# Patient Record
Sex: MALE | Race: WHITE
[De-identification: names, ages, dates, MRNs, and addresses within clinical notes are randomized per-mention and may not be internally consistent; named-entity substitution may affect disease eponyms.]

---

## 2017-01-04 ENCOUNTER — HOSPITAL ENCOUNTER (OUTPATIENT)
Dept: HOSPITAL 45 - C.PATHSPEC | Age: 82
Discharge: HOME | End: 2017-01-04
Attending: UROLOGY
Payer: COMMERCIAL

## 2017-01-04 DIAGNOSIS — R97.20: Primary | ICD-10-CM

## 2017-06-16 ENCOUNTER — HOSPITAL ENCOUNTER (OUTPATIENT)
Dept: HOSPITAL 45 - C.LABBC | Age: 82
Discharge: HOME | End: 2017-06-16
Attending: INTERNAL MEDICINE
Payer: COMMERCIAL

## 2017-06-16 DIAGNOSIS — M54.5: Primary | ICD-10-CM

## 2017-06-16 LAB
ALBUMIN/GLOB SERPL: 1 {RATIO} (ref 0.9–2)
ALP SERPL-CCNC: 57 U/L (ref 45–117)
ALT SERPL-CCNC: 28 U/L (ref 12–78)
ANION GAP SERPL CALC-SCNC: 8 MMOL/L (ref 3–11)
AST SERPL-CCNC: 21 U/L (ref 15–37)
BASOPHILS # BLD: 0.03 K/UL (ref 0–0.2)
BASOPHILS NFR BLD: 0.6 %
BUN SERPL-MCNC: 17 MG/DL (ref 7–18)
BUN/CREAT SERPL: 15.8 (ref 10–20)
CALCIUM SERPL-MCNC: 9 MG/DL (ref 8.5–10.1)
CHLORIDE SERPL-SCNC: 100 MMOL/L (ref 98–107)
CHOLEST/HDLC SERPL: 2.6 {RATIO}
CO2 SERPL-SCNC: 30 MMOL/L (ref 21–32)
COMPLETE: YES
CREAT SERPL-MCNC: 1.1 MG/DL (ref 0.6–1.4)
EOSINOPHIL NFR BLD AUTO: 217 K/UL (ref 130–400)
GLOBULIN SER-MCNC: 3.5 GM/DL (ref 2.5–4)
GLUCOSE SERPL-MCNC: 100 MG/DL (ref 70–99)
GLUCOSE UR QL: 49 MG/DL
HCT VFR BLD CALC: 45.5 % (ref 42–52)
IG%: 0.4 %
IMM GRANULOCYTES NFR BLD AUTO: 23.5 %
KETONES UR QL STRIP: 68 MG/DL
LYMPHOCYTES # BLD: 1.13 K/UL (ref 1.2–3.4)
MCH RBC QN AUTO: 29.2 PG (ref 25–34)
MCHC RBC AUTO-ENTMCNC: 32.3 G/DL (ref 32–36)
MCV RBC AUTO: 90.5 FL (ref 80–100)
MONOCYTES NFR BLD: 11.4 %
NEUTROPHILS # BLD AUTO: 4.6 %
NEUTROPHILS NFR BLD AUTO: 59.5 %
NITRITE UR QL STRIP: 62 MG/DL (ref 0–150)
PH UR: 129 MG/DL (ref 0–200)
PMV BLD AUTO: 9.7 FL (ref 7.4–10.4)
POTASSIUM SERPL-SCNC: 4 MMOL/L (ref 3.5–5.1)
RBC # BLD AUTO: 5.03 M/UL (ref 4.7–6.1)
SODIUM SERPL-SCNC: 138 MMOL/L (ref 136–145)
TSH SERPL-ACNC: 1.68 UIU/ML (ref 0.3–4.5)
VERY LOW DENSITY LIPOPROT CALC: 12 MG/DL
WBC # BLD AUTO: 4.81 K/UL (ref 4.8–10.8)

## 2017-07-17 ENCOUNTER — HOSPITAL ENCOUNTER (OUTPATIENT)
Dept: HOSPITAL 45 - C.LABBC | Age: 82
Discharge: HOME | End: 2017-07-17
Attending: UROLOGY
Payer: COMMERCIAL

## 2017-07-17 DIAGNOSIS — C61: Primary | ICD-10-CM

## 2017-07-17 LAB
BUN SERPL-MCNC: 19 MG/DL (ref 7–18)
BUN/CREAT SERPL: 17 (ref 10–20)
CREAT SERPL-MCNC: 1.1 MG/DL (ref 0.6–1.4)
PSA SERPL-MCNC: 7.03 NG/ML (ref 0–4)

## 2017-10-05 ENCOUNTER — HOSPITAL ENCOUNTER (OUTPATIENT)
Dept: HOSPITAL 45 - C.LABBC | Age: 82
Discharge: HOME | End: 2017-10-05
Attending: UROLOGY
Payer: COMMERCIAL

## 2017-10-05 DIAGNOSIS — C61: Primary | ICD-10-CM

## 2017-10-05 LAB
BUN SERPL-MCNC: 16 MG/DL (ref 7–18)
BUN/CREAT SERPL: 15.9 (ref 10–20)
CREAT SERPL-MCNC: 1 MG/DL (ref 0.6–1.4)
PSA FREE MFR SERPL: 11.8 %
PSA FREE SERPL-MCNC: 0.77 NG/ML

## 2017-10-12 ENCOUNTER — HOSPITAL ENCOUNTER (OUTPATIENT)
Dept: HOSPITAL 45 - C.MRIBC | Age: 82
Discharge: HOME | End: 2017-10-12
Attending: UROLOGY
Payer: COMMERCIAL

## 2017-10-12 DIAGNOSIS — N40.0: ICD-10-CM

## 2017-10-12 DIAGNOSIS — C61: Primary | ICD-10-CM

## 2017-10-12 NOTE — DIAGNOSTIC IMAGING REPORT
PROSTATE MRI COMBO



CLINICAL HISTORY: 82 years-old Male presenting with prostate carcinoma. PSA 7

ng/mL. 



TECHNIQUE: Multisequence, multiplanar MR imaging of the prostate was performed

before and after the administration of intravenous contrast. Additional

postprocessing was performed on a separate [employer] workstation by the

radiologist for 3-D volumetric segmentation of the prostate and contouring of

region(s) of interest (JOSE D) for targeting. IV contrast: 6.5 cc intravenous

[facility] 



COMPARISON: None.



FINDINGS:



Prostate: The prostate measures 4.9 x 3.9 x 4.2 cm cm (DynaCAD prostate boundary

segmentation volume 35 mL). Mild changes of benign prostatic hyperplasia.

Precontrast T1 weighted imaging demonstrates no evidence of intrinsic T1

hyperintensity to suggest hemorrhage. Seminal vesicles normal. Suspicious

lesion(s) described below:



Lesion (DynaCAD JOSE D) 1:

Location: Left anterior transition zone at the mid gland. The lesion does not

extend across the midline.

Size: 13 mm (as measured on ADC for PZ lesion and T2WI for TZ lesion)

T2W: 4.     No evidence of extraprostatic extension.

DWI: 4. Focal markedly hypointense on ADC and markedly hyperintense on high

b-value DWI.

DCE: Positive. Focal enhancement corresponding to a suspicious finding, earlier

or contemporaneous with adjacent normal tissue.

PI-RADS: 4. Clinically significant cancer is likely to be present. 





Bladder: Normal.



Bowel: Visualized portion of the rectum normal.



Peritoneum: No free fluid in the pelvis.



Lymph nodes: No lymphadenopathy in the visualized portion of the pelvis.



Vasculature: Iliac vessels patent.



Osseous structures: Normal bone marrow signal intensity.



IMPRESSION:

1.  13 mm lesion in the left anterior transition zone at the mid gland. PI-RADS

4. Clinically significant cancer is likely to be present. This lesion has been

segmented for targeted biopsy.



2.  Benign prostatic hyperplasia.







Electronically signed by:  Faustino Garcia M.D.

10/12/2017 10:34 AM



Dictated Date/Time:  10/12/2017 10:28 AM

## 2017-10-24 ENCOUNTER — HOSPITAL ENCOUNTER (OUTPATIENT)
Dept: HOSPITAL 45 - C.NUCL | Age: 82
Discharge: HOME | End: 2017-10-24
Attending: UROLOGY
Payer: COMMERCIAL

## 2017-10-24 DIAGNOSIS — C61: Primary | ICD-10-CM

## 2017-10-24 NOTE — DIAGNOSTIC IMAGING REPORT
BONE SCAN WHOLE BODY



HISTORY:  82 years-old Male C61 Prostate [id number] metastatic survey in

a patient with prostate cancer. History of right total hip arthroplasty. Patient

complains of back pain. 



COMPARISON: Bone scan 3/14/2011, pelvis radiograph 11/18/2011, CT 12/15/2006.



TECHNIQUE: Anterior and posterior whole body bone scan scintigraphic planar

images were obtained utilizing 26.5 mCi technetium 99 MDP. Scan was obtained 3

hours post injection.



FINDINGS: 

Unchanged hydronephrosis of the left kidney. Physiologic radiotracer uptake is

seen about the right kidney and urinary bladder. Mildly increased tracer uptake

about the appendicular skeletal system suggest degenerative changes, notably

within the knees, shoulders and feet. Photopenic defect within the right femoral

head, neck and proximal shaft correlates with right hip arthroplasty. Mildly

increased radiotracer uptake surrounding arthroplasty is likely secondary to

remodeling changes or loosening.



There is moderately increased radiotracer uptake involving the lower lumbar

spine at L4-L5. Additionally, there is moderate indeterminate radiotracer uptake

of the midline anterior upper chest within the region of the sternum, seen best

on the frontal views. Focal area of mildly increased radiotracer uptake is seen

within the region of the posterior aspect left 11th rib.



IMPRESSION: 

1. Indeterminate focal area of moderately increased radiotracer uptake of the

midline anterior upper chest within the region of the sternum. Correlate with CT

of the chest. 

2. Focal moderate radiotracer uptake within the region of the inferior endplate

L4. This is suspicious for possible compression deformity rather than

metastasis. Correlate with lumbar spine radiographs.

3. Mildly increased radiotracer uptake of the left 11th rib suggests

age-indeterminate rib fracture. This could also be correlated with radiographs.

4. Mild tracer uptake about the right hip arthroplasty likely reflects

physiologic remodeling changes or loosening. 

5. Unchanged left hydronephrosis.







The above report was generated using voice recognition software. It may contain

grammatical, syntax or spelling errors.







Electronically signed by:  Ryan Manjarrez M.D.

10/24/2017 2:29 PM



Dictated Date/Time:  10/24/2017 1:39 PM

## 2018-01-03 ENCOUNTER — HOSPITAL ENCOUNTER (OUTPATIENT)
Dept: HOSPITAL 45 - C.PATHSPEC | Age: 83
Discharge: HOME | End: 2018-01-03
Attending: UROLOGY
Payer: COMMERCIAL

## 2018-01-03 DIAGNOSIS — C61: Primary | ICD-10-CM

## 2018-01-29 ENCOUNTER — HOSPITAL ENCOUNTER (OUTPATIENT)
Dept: HOSPITAL 45 - C.RADBC | Age: 83
Discharge: HOME | End: 2018-01-29
Attending: PHYSICIAN ASSISTANT
Payer: COMMERCIAL

## 2018-01-29 DIAGNOSIS — R05: Primary | ICD-10-CM

## 2018-01-29 NOTE — DIAGNOSTIC IMAGING REPORT
CHEST 2 VIEWS ROUTINE



CLINICAL HISTORY: 82 years-old Male presenting with COUGH. 



TECHNIQUE: PA and lateral views of the chest were obtained.



COMPARISON: 11/8/2011.



FINDINGS:

Atherosclerosis of the aortic arch. Cardiac silhouette normal in size. Calcified

granuloma noted at the left apex. Lungs and pleural spaces otherwise clear.

Degenerative changes of the thoracic spine. Upper abdomen normal.



IMPRESSION:

1.  No acute cardiopulmonary disease.







Electronically signed by:  Bahman Cintron M.D.

1/29/2018 12:26 PM



Dictated Date/Time:  1/29/2018 12:25 PM

## 2018-01-30 ENCOUNTER — HOSPITAL ENCOUNTER (OUTPATIENT)
Dept: HOSPITAL 45 - C.CTS | Age: 83
Discharge: HOME | End: 2018-01-30
Attending: INTERNAL MEDICINE
Payer: COMMERCIAL

## 2018-01-30 DIAGNOSIS — E04.1: Primary | ICD-10-CM

## 2018-01-30 DIAGNOSIS — R94.8: ICD-10-CM

## 2018-01-30 NOTE — DIAGNOSTIC IMAGING REPORT
(CHEST) THORAX WITHOUT



CLINICAL HISTORY: R94.8 Abnormal radionuclide bone scan PROSTATE CARCINOMA



COMPARISON STUDY:  Whole-body bone scan dated 10/24/2017, chest x-ray dated

1/29/2018 



CT DOSE: 316.35 mGy.cm



TECHNIQUE:  CT of the thorax was performed from the thoracic inlet to the lung

bases. Images are reviewed in the axial, sagittal, and coronal planes. IV

contrast was not administered for this examination.  A dose lowering technique

was utilized adhering to the principles of ALARA.





FINDINGS:



Thyroid: Is a 23 mm left lobe thyroid nodule.



Thoracic aorta: There is mild ectasia of the ascending thoracic aorta which

measures 38 mm in diameter.



Heart: There are mild coronary artery calcifications present.



Lungs and pleural spaces: No pleural effusions are visualized. There is no focal

pulmonary consolidation. There are bilateral pleural plaques, several which are

calcified. This suggests a prior occupational exposure history. There are no

suspicious pulmonary masses. There is a calcified left upper lobe granuloma.



Mediastinum: There is no mediastinal lymphadenopathy.



Sherita: There is no evidence of pathologic hilar adenopathy given the limitations

of a noncontrast study



Axilla: There is no evidence of pathologic axillary lymphadenopathy



Upper abdomen:  There is a hiatal hernia. There are multiple large left renal

cysts measuring up to 9.4 cm in diameter



Skeletal structures: No lytic or blastic lesions are visualized. No sternal or

manubrial lesions are evident. The upper thoracic activity described on the

prior bone scan will nonspecific but potentially relates to prominent anterior

vertebral body osteophytes



IMPRESSION:  

1. 23 mm left lobe thyroid nodule

2. Small calcified pleural plaques

3. No suspicious pulmonary masses

4. No evidence of pathologic adenopathy

5. No lytic or blastic skeletal lesions are visualized







Electronically signed by:  Faustino Garcia M.D.

1/30/2018 4:45 PM



Dictated Date/Time:  1/30/2018 4:37 PM

## 2018-02-05 ENCOUNTER — HOSPITAL ENCOUNTER (OUTPATIENT)
Dept: HOSPITAL 45 - C.ULTR | Age: 83
Discharge: HOME | End: 2018-02-05
Attending: INTERNAL MEDICINE
Payer: COMMERCIAL

## 2018-02-05 DIAGNOSIS — E04.1: Primary | ICD-10-CM

## 2018-02-05 NOTE — DIAGNOSTIC IMAGING REPORT
ULTRASOUND OF THE THYROID GLAND



CLINICAL HISTORY: Thyroid nodule.



COMPARISON STUDY: Chest CT dated 1/30/2018.



TECHNIQUE: Real-time, grayscale, and color flow sonography of the thyroid gland

is performed utilizing a high-frequency linear transducer. Images are reviewed

in the transverse and longitudinal planes.



FINDINGS:



Right lobe: The right lobe of the thyroid gland is normal in size and

homogeneous in echotexture, measuring 5.1 x 1.9 x 1.8 cm.



Left lobe: The left lobe of the thyroid gland is normal in size and homogeneous

in echotexture, measuring 4.9 x 1.5 x 1.3 cm.



Isthmus: The thyroid isthmus is normal in appearance and measures 0.1 cm in AP

diameter.





IMPRESSION: 



1. The thyroid gland is normal in size and homogeneous in echotexture.



2. No thyroid nodule is identified.



3. The thyroid nodule questioned by CT on 1/30/2018 likely corresponded to focal

dilatation of the adjacent internal jugular vein. This is of doubtful

significance.







Electronically signed by:  Eren Fischer M.D.

2/5/2018 12:26 PM



Dictated Date/Time:  2/5/2018 12:25 PM

## 2018-02-08 ENCOUNTER — HOSPITAL ENCOUNTER (OUTPATIENT)
Dept: HOSPITAL 45 - C.ONC | Age: 83
Discharge: HOME | End: 2018-02-08
Attending: PHYSICIAN ASSISTANT
Payer: COMMERCIAL

## 2018-02-08 VITALS
HEART RATE: 70 BPM | TEMPERATURE: 98.42 F | DIASTOLIC BLOOD PRESSURE: 85 MMHG | SYSTOLIC BLOOD PRESSURE: 162 MMHG | OXYGEN SATURATION: 98 %

## 2018-02-08 DIAGNOSIS — Z08: Primary | ICD-10-CM

## 2018-02-08 DIAGNOSIS — Z85.46: ICD-10-CM

## 2018-02-08 NOTE — RADIATION ONCOLOGY FOLLOW-UP
Radiation Oncology Follow-Up


Date of Visit


2018.





Reason For Visit


1 year follow-up has undergone active surveillance and rebiopsy





Radiation Completion Date


not applicable





Diagnosis





(1) Prostate cancer


Status:  Acute        Onset Date:  2017


Stage:  ll


Permanent Comment:  Rising PSA,  pretreatment PSA 6.03


Status post ultrasound guided biopsies 2017


Whitetop grade 3+3


Prostate volume 36.5


Prostate density 0.165


Active surveillance, last PSA 10/05/2017 at 6.550


Status post ultrasound-guided biopsies 2018


Felicitas 3+3 and 3+4  Last Edited By: Rea Oneal on 2018 10:50





History of Present Illness


Mr. Schulte is without a family history of prostate cancer.  He has been 

followed with prostate-specific antigens.  These remained under 2014.  In 

2015 the prostate-specific antigen was 4.76.  This was repeated on in 2015 and was 4.3.  In 2015 prostate-specific antigen was 4.7.  May 

2016 prostate-specific antigen was 5.85 and on 2016 the prostate-specific 

antigen was 6.03.  The patient's digital exam is remained unremarkable.  His 

free prostate-specific antigen was 14.6%.  Because of the change in prostate-

specific antigen Dr. Leo discussed the option of ultrasound-guided biopsies 

with the patient.  He ultimately did agree and the biopsy was scheduled and 

performed on 2017.





A total of 14 samples were taken.  This included two biopsies each from the 

left and right base, left and right mid gland, left and right apex and one 

biopsy from the left and right anterior gland.  The biopsies from the left base

, left mid gland, right mid gland, left apex and right apex were all benign.  2 

of 2 biopsies from the right base were positive for adenocarcinoma Whitetop 

grade 3+3 involving less than 10% of the aggregate core length.  No perineural 

or lymphovascular invasion was identified.  One of 2 biopsy from the left apex 

was positive for prostatic adenocarcinoma Felicitas grade 3+3 involving 50% of 

the core length with no perineural or lymphovascular invasion identified.  One 

biopsy from the left anterior gland was positive for prostatic adenocarcinoma 

Whitetop grade 3+3 involving 60% of the core length with no perineural or 

lymphovascular invasion identified.  Case: 17-once 17-S.  Therefore total of 4 

out of 14 biopsies were positive all Whitetop grade 3+3.  2 biopsies from the 

left gland in 2 biopsies from the right gland were positive.





The patient met with Dr. Leo to discuss treatment options.  He arranged for 

us to see the patient in referral to also discuss treatment options.





Interim History


Patient had been seen on 2017.  Options of treatment were reviewed.  His 

decision was to be treated with active surveillance.  He has been followed very 

closely by Dr. Leo.  He has had recheck PSAs.  On 2017 the PSA was 

7.030.  Recheck PSA 10/05/2017 was 6.550.  Recheck biopsy was recommended and 

carried out on 2018.  Prior to the biopsy he had an MRI in order to have 

guidance to targeted lesions.  MRI on 10/12/2017 showed 13 mm lesion in the 

left anterior transitional zone at the mid gland.  The biopsies were performed 

and a total of 17 biopsies were taken.  6 of the biopsies were positive.  At 

the right base one of 2 biopsies were positive with a Whitetop 3+3 perineural 

invasion was negative.  12.5% of the core was involved.  Biopsy at the right 

mid was positive with a 3+3.  This was one core.  5% of the core was involved.  

There was perineural invasion present.  A biopsy at the left apex showed 3+4.  

Perineural invasion was not seen.  15% of the core was a Felicitas 4.  The total 

amount of core was 50%.  A biopsy at the left anterior showed a 3+3.  60% of 

the core was involved.  Perineural invasion was not seen.  The targeted lesion 

was biopsied and found to have a Felicitas 3+3 in 2 of 3 cores.  The percentage 

of the cores involved was 50% and 55%.  Calculation was performed on the PSAs 

and there was a doubling time of 5.5 years.





Allergies


Coded Allergies:  


     No Known Allergies (Verified , 3/17/10)





Home Medications


Scheduled


Diphenhydramine Hcl (Benadryl *), 50 MG PO HS PRN


Fluticasone Propionate (Nasal) (Flonase Allergy Relief), 2 SPRAYS NA DAILY


Ocuvite Preservision (Ocuvite Preservision), 1 TAB PO DAILY


Omeprazole (Prilosec), 20 MG PO QAM


Psyllium (Metamucil), 1 TSP PO DAILY


Ranitidine (Zantac), 150 MG PO HS


Simvastatin (Zocor), 40 MG PO QPM


Tobramycin/Dexamethasone 0.3% Oph (Tobradex 0.3% Oph), 1 DROP OPB DAILY


Triamterene/Hctz (Triamterene/Hctz 37.5-25MG Tab), 1 TAB PO DAILY





Scheduled PRN


Naproxen (Aleve), 220 MG PO DAILY PRN for Pain





Review of Systems


Gastrointestinal:  


   Symptoms:  WNL


Oral:  


   Symptoms:  No Problems


Respiratory:  


   Symptoms:  Moist Cough


   Respiratory Comments:  getting over a flu


   Sputum Character:  clear, thick


Urinary:  


   Comments:  occasionally hard to start


Skin:  


   Symptoms:  No Problems





Physical Exam





Vital Signs








  Date Time  Temp Pulse Resp B/P (MAP) Pulse Ox O2 Delivery O2 Flow Rate FiO2


 


18 09:42 36.9 70 18 162/85 98   








Fatigue:  None


General Appearance:  no apparent distress


Eyes:  normal inspection, EOMI


ENT:  normal ENT inspection, hearing grossly normal


Respiratory/Chest:  lungs clear, no respiratory distress, no accessory muscle 

use


Cardiovascular:  regular rate, rhythm, no gallop, no murmur


Abdomen:  non tender, soft, no organomegaly


Extremities:  no pedal edema


Neurologic/Psychiatric:  no motor/sensory deficits, alert, normal mood/affect





Pain Management


Patient Reports Pain:  No


Pain Location:  None


Patient Preferred Pain Scale:  0 - 10


Initial Pain Intensity:  0.0


Pain Management Plan


He denies pain therefore requires no pain management.





Laboratory


Laboratory Results:  were reviewed, and pertinent findings noted in HPI


Laboratory Comments:


Reviewed in the interim history.





Pathology


Pathology Results:  were reviewed, and pertinent findings noted below


Pathology Comments





 


 [facility]


 SURGICAL PATHOLOGY REPORT











Name RICHY SCHULTE Case: 








  








 





 SURGICAL PATHOLOGY REPORT





                                                            Page 4 of 4


  





 


 [address] [address]


 Bahman Burnham M.D.  Director


 SURGICAL PATHOLOGY REPORT





  





 





 SURGICAL PATHOLOGY REPORT





                                                            Page 1 of 1


  





 











Name RICHY SCHULTE Age/Sex  82/M Location: [facility]   [id number] :  1935 /Bed 


 


Acct# [id number] Physician: Enrique Leo MD, Urology 


 


  








  


 











Case: -S Received  18 Specimen Date  18








  


 


CLINICAL HISTORY





  


R97.20 


GROSS DESCRIPTION





  


A.  LEFT BASE X2 





The specimen is received in a container labeled as left base with patient name 

Richy Schulte.  The specimen consists of two cylindrical fragments of pink soft 

tissue, each measuring 2 cm in length and averaging 0.1 cm in diameter.  The 

specimen is entirely submitted in a single cassette as A for levels.





B.  RIGHT BASE X2 





The specimen is received in a container labeled as right base with patient name 

Richy Schulte.  The specimen consists of three cylindrical fragments of pink 

soft tissue which range from 0.2 to 1.6 cm in length and average 0.1 cm in 

diameter.  The specimen is entirely submitted in a single cassette as B for 

levels. 





C.  LEFT MID X2 





The specimen is received in a container labeled as left mid with patient name 

Richy Schulte.  The specimen consists of five cylindrical fragments of pink soft 

tissue which range from 0.2 to 1.5 cm in length and average 0.1 cm in diameter.

  The specimen is entirely submitted in a single cassette as C for levels.





D.  RIGHT MID X2 





The specimen is received in a container labeled as right mid with patient name 

Richy Schulte.  The specimen consists of two cylindrical fragments of pink soft 

tissue which measure 1.5 and 1.7 cm in length and average 0.1 cm in diameter.  

The specimen is entirely submitted in a single cassette as D for levels.





E.  LEFT APEX X2 





The specimen is received in a container labeled as left apex with patient name 

Richy Schulet.  The specimen consists of three cylindrical fragments of pink 

soft tissue which range from 0.2 to 1.8 cm in length and average 0.1 cm in 

diameter.  The specimen is entirely submitted in a single cassette as E for 

levels.





F.  RIGHT APEX X2 





The specimen is received in a container labeled as right apex with patient name 

Richy Schulte.  The specimen consists of three cylindrical fragments of pink 

soft tissue which range from 0.3 to 2 cm in length and average 0.1 cm in 

diameter.  The specimen is entirely submitted in a single cassette as F for 

levels. 





G.  LEFT ANTERIOR X1 





The specimen is received in a container labeled as left anterior with patient 

name Richyjuliette Schulte.  The specimen consists of a single cylindrical fragment of 

pink soft tissue which measures 1.8 cm in length and averages 0.1 cm in 

diameter.  The specimen is entirely submitted in a single cassette as G for 

levels.





H.  RIGHT ANTERIOR X1 





The specimen is received in a container labeled as right anterior with patient 

name Richy Schulte.  The specimen consists of a single cylindrical fragment of 

pink soft tissue which measures 1.7 cm in length and averages 0.1 cm in 

diameter.  The specimen is entirely submitted in a single cassette as H for 

levels. 





I.  LESION 1 X3 





The specimen is received in a container labeled as lesion #1 with patient name 

Richy Schulte.   The specimen consists of three cylindrical fragments of pink 

soft tissue, each measuring 1.3 cm left and averaging 0.1 cm in diameter.  The 

specimen is entirely submitted in a single cassette as I for levels.  





SH/mas  


FINAL DIAGNOSIS





  


A.  PROSTATE, LEFT BASE, BIOPSIES:  BENIGN PROSTATIC TISSUE WITH MILD CHRONIC 

INFLAMMATION.  NO NEOPLASM SEEN.  





B.  PROSTATE, RIGHT BASE, BIOPSIES:  


1.  ADENOCARCINOMA, FELICITAS SCORE 3+3=6 (PROGNOSTIC GROUP 1) INVOLVING ONE OF 

TWO CORES.


2.  TUMOR INVOLVES 0.15 CM/12.5% OF A 1.2 CM CORE.


3.  PERINEURAL INVASION NOT SEEN.





C.  PROSTATE, LEFT MID, BIOPSIES:  BENIGN PROSTATIC TISSUE.  NO NEOPLASM SEEN.





D.  PROSTATE, RIGHT MID, BIOPSIES:  


1.  ADENOCARCINOMA, FELICITAS SCORE 3+3=6 (PROGNOSTIC GROUP 1) INVOLVING ONE OF 

TWO CORES. 


2.  TUMOR INVOLVES 0.1 CM/5% OF A 1.7 CM CORE.


3.  PERINEURAL INVASION PRESENT.  





E.  PROSTATE, LEFT APEX, BIOPSIES:  


1.  ADENOCARCINOMA, FELICITAS SCORE 3+4=7 (PROGNOSTIC GROUP 2) INVOLVING ONE OF 

TWO CORES. 


2.  TUMOR INVOLVES 0.6 CM/50% OF A 1.2 CM CORE.


3.  I WOULD ESTIMATE THAT 15% OF THE TUMOR IS FELICITAS PATTERN 4.  


4.  PERINEURAL INVASION NOT SEEN.  





F.  PROSTATE, RIGHT APEX, BIOPSIES:  BENIGN PROSTATIC TISSUE.  NO NEOPLASM 

SEEN. 





G.  PROSTATE, LEFT ANTERIOR, BIOPSY:  


1.  ADENOCARCINOMA, FELICITAS SCORE 3+3=6 (PROGNOSTIC GROUP 1) INVOLVING ONE OF 

ONE CORES.


2.  TUMOR INVOLVES 0.9 CM/60% OF A 1.5 CM CORE.  


3.  PERINEURAL INVASION NOT SEEN.  





H.  PROSTATE, RIGHT ANTERIOR, BIOPSY:  BENIGN PROSTATIC TISSUE.  NO NEOPLASM 

SEEN.  





I.  PROSTATE, LESION #1, BIOPSIES:  


1.  ADENOCARCINOMA, FELICITAS SCORE 3+3=6 (PROGNOSTIC GROUP 1) INVOLVING TWO OF 

THREE CORES.  


2.  TUMOR INVOLVES 0.6 CM/50% OF A 1.2 CM CORE AND 0.5 CM/55% OF A 0.9 CM CORE.


3.  PERINEURAL INVASION NOT SEEN.  





/mas 


 


COPIES TO





  


   Enrique Leo MD, Urology


   [address] [place] [address]


   [phone]





   No Doctor, Assigned


   [address] [place] [address] 











Signed: <signature on file> 18 Salbador Nugent MD











Imaging


Imaging Studies:  were reviewed, and pertinent findings noted below


Imaging Comments


 











Patient:  RICHY SCHULTE Address1:  [address] 


 


Med Rec:  [id number] Address2:  [address]


 


Acct ID:  [id number] [facility] Zip:  [place] [address]


 


YOB: 1935          Sex:  M Room/Bed:  


 


Ref Phy:  Bahman Morin M.D. SC: CHUNGBC


 


Att Phy:  Enrique Leo MD, Urology Report #:  [id number]


 


Katya Phy:  Bahman Morin M.D. Test:  PROSTATE


 


Admit Phy:   Technician:  SANDHYA


 


Interpreting Phy:  Faustino Garcia M.D. Diagnosis:  PROSTATE CA, PSA LEVEL 7.03


 


Ordering Phy:  Enrique Leo MD Service Date:  10/12/17


 


Admit Date: 10/12/17 MNE: [email or web address]


 


 CONF:


 


 DICTATED BY: Faustino Garcia M.D.]]


 


CC: Bahman Morin M.D. Miller, Howard I., MD, Urology


 


 Endcc:











[~ rep ct add3]]


PROSTATE MRI COMBO





CLINICAL HISTORY: 82 years-old Male presenting with prostate carcinoma. PSA 7


ng/mL. 





TECHNIQUE: Multisequence, multiplanar MR imaging of the prostate was performed


before and after the administration of intravenous contrast. Additional


postprocessing was performed on a separate saambaa workstation by the


radiologist for 3-D volumetric segmentation of the prostate and contouring of


region(s) of interest (JOSE D) for targeting. IV contrast: 6.5 cc intravenous


Gadavist 





COMPARISON: None.





FINDINGS:





Prostate: The prostate measures 4.9 x 3.9 x 4.2 cm cm (DynaCAD prostate boundary


segmentation volume 35 mL). Mild changes of benign prostatic hyperplasia.


Precontrast T1 weighted imaging demonstrates no evidence of intrinsic T1


hyperintensity to suggest hemorrhage. Seminal vesicles normal. Suspicious


lesion(s) described below:





Lesion (DynaCAD JOSE D) 1:


Location: Left anterior transition zone at the mid gland. The lesion does not


extend across the midline.


Size: 13 mm (as measured on ADC for PZ lesion and T2WI for TZ lesion)


T2W: 4.     No evidence of extraprostatic extension.


DWI: 4. Focal markedly hypointense on ADC and markedly hyperintense on high


b-value DWI.


DCE: Positive. Focal enhancement corresponding to a suspicious finding, earlier


or contemporaneous with adjacent normal tissue.


PI-RADS: 4. Clinically significant cancer is likely to be present. 








Bladder: Normal.





Bowel: Visualized portion of the rectum normal.





Peritoneum: No free fluid in the pelvis.





Lymph nodes: No lymphadenopathy in the visualized portion of the pelvis.





Vasculature: Iliac vessels patent.





Osseous structures: Normal bone marrow signal intensity.





IMPRESSION:


1.  13 mm lesion in the left anterior transition zone at the mid gland. PI-RADS


4. Clinically significant cancer is likely to be present. This lesion has been


segmented for targeted biopsy.





2.  Benign prostatic hyperplasia.











Electronically signed by:  Faustino Garcia M.D.


10/12/2017 10:34 AM





Dictated Date/Time:  10/12/2017 10:28 AM








Patient:  RICHY SCHULTE Address1:  [address]


 


[facility] Rec:  [id number] Address2:  


 


Acct ID:  [id number] [facility] Zip:  [place] [address]


 


YOB: 1935          Sex:  M Room/Bed:  


 


Ref Phy:  Bahman Morin M.D. SC: RONNIL


 


Att Phy:  Enrique Leo MD, Urology Report #:  [id number]


 


Katya Phy:  Bahman Morin M.D. Test:  BSW


 


Admit Phy:   Technician:  ROME


 


Interpreting Phy:  Albino Manjarrez D.O. Diagnosis:  PROSTATE CA


 


Ordering Phy:  Enrique Leo MD Service Date:  10/24/17


 


Admit Date: 10/24/17 MNE: [email or web address]


 


 CONF:


 


 DICTATED BY: Albino Manjarrez D.O.]]


 


CC: Bahman Morin M.D. Miller, Howard I., MD, Urology


 


 Endcc:











[~ rep ct add3]]


BONE SCAN WHOLE BODY





HISTORY:  82 years-old Male C61 Prostate [id number] metastatic survey in


a patient with prostate cancer. History of right total hip arthroplasty. Patient


complains of back pain. 





COMPARISON: Bone scan 3/14/2011, pelvis radiograph 2011, CT 12/15/2006.





TECHNIQUE: Anterior and posterior whole body bone scan scintigraphic planar


images were obtained utilizing 26.5 mCi technetium 99 MDP. Scan was obtained 3


hours post injection.





FINDINGS: 


Unchanged hydronephrosis of the left kidney. Physiologic radiotracer uptake is


seen about the right kidney and urinary bladder. Mildly increased tracer uptake


about the appendicular skeletal system suggest degenerative changes, notably


within the knees, shoulders and feet. Photopenic defect within the right femoral


head, neck and proximal shaft correlates with right hip arthroplasty. Mildly


increased radiotracer uptake surrounding arthroplasty is likely secondary to


remodeling changes or loosening.





There is moderately increased radiotracer uptake involving the lower lumbar


spine at L4-L5. Additionally, there is moderate indeterminate radiotracer uptake


of the midline anterior upper chest within the region of the sternum, seen best


on the frontal views. Focal area of mildly increased radiotracer uptake is seen


within the region of the posterior aspect left 11th rib.





IMPRESSION: 


1. Indeterminate focal area of moderately increased radiotracer uptake of the


midline anterior upper chest within the region of the sternum. Correlate with CT


of the chest. 


2. Focal moderate radiotracer uptake within the region of the inferior endplate


L4. This is suspicious for possible compression deformity rather than


metastasis. Correlate with lumbar spine radiographs.


3. Mildly increased radiotracer uptake of the left 11th rib suggests


age-indeterminate rib fracture. This could also be correlated with radiographs.


4. Mild tracer uptake about the right hip arthroplasty likely reflects


physiologic remodeling changes or loosening. 


5. Unchanged left hydronephrosis.











The above report was generated using voice recognition software. It may contain


grammatical, syntax or spelling errors.











Electronically signed by:  Ryan Manjarrez M.D.


10/24/2017 2:29 PM





Dictated Date/Time:  10/24/2017 1:39 PM





Assessment & Plan (Attending)


Mr. Schulte is an 82-year-old gentleman who was originally seen in referral by 

me on 2017.  At that time he had a history of slowly rising prostate-

specific antigen reaching 6.03 on 2016.  Patient underwent ultrasound-

guided biopsies.  Of the 14 biopsies taken for positive all Felicitas grade 3+3.  

In discussion with the patient given his age and low risk disease we discussed 

treatment options with primary recommendation to strongly consider active 

surveillance.  We also discussed the possibility of genetic testing which the 

patient decided against at that time.  The patient continued to be followed by 

Dr. Leo and on 2017 had a repeat prostate-specific antigen that jumped 

to 7.03.  This was ultimately repeated on 10/05/2017 with the prostate-specific 

antigen returning to 6.55.





The patient underwent a prostate MRI, on 10/12/2017 the prostate measured 4.9 x 

3.9 x 4.2 cm with an estimated volume 35 mL.  The seminal vesicles were normal.

  In the left anterior transition zone at the mid gland a 13 mm lesion was 

noted but did not extend across the midline.  There was no evidence of 

extraprostatic extension.  This was given a PI-RADS for consistent with a 

clinically significant cancer.  A bone scan was also performed on 10/24/2017 

which showed no definite evidence of metastatic disease.  There was uptake 

consistent with arthritic changes and a age indeterminate left 11th rib 

fracture.  On 2018 patient underwent a repeat ultrasound-guided biopsy 

utilizing the information from the MRI.  2 biopsies from the left and right base

, left and right.,  Left and right apex and one biopsy from the left and right 

anterior gland were taken along with 3 biopsies from the abnormal lesion as 

noted by MRI.  Biopsy from the left base revealed benign prostatic tissues with 

mild chronic inflammation but no neoplasm seen.  Biopsy from the right base, 

left mid, right apex and right anterior were benign with no neoplasm seen.  One 

of 2 biopsies from the right base was positive for adenocarcinoma Whitetop grade 

involving 12.5% of the core tissue sample with no perineural invasion seen.  

One of 2 biopsies in the right mid gland were positive for adenocarcinoma 

Felicitas grade 3+3 involving 5% of the core tissue sample with perineural 

invasion seen.  One of 2 biopsies in the left apex were positive for 

adenocarcinoma Whitetop grade 3+4 involving 50% of the core tissue sample.  The 

Whitetop pattern 4 represented 15% of the total tumor volume with no perineural 

invasion seen.  One of one biopsies in the left anterior were positive for 

adenocarcinoma Felicitas grade 3+3 involving 60% of the core tissue sample with 

no perineural invasion seen.  2 of 3 biopsies taken from the MRI PI-RADS target 

were positive for adenocarcinoma Whitetop grade 3+3 involving 50% 55% of the 

core tissue sample with no perineural invasion seen.  Case: [id number].





Comparing the most recent biopsy to the prior biopsy the number of positive 

biopsies increased from 4-6.  The initial biopsies were positive on the left 

and the most recent biopsies are positive in both right and left gland.  The 

original biopsies were all Felicitas grade 3+3.  One out of 6 biopsies in the 

most recent sampling was a Whitetop grade 3+4 with the Felicitas pattern 4 

represented 15% of the core tissue sample.  It was 1 mention of perineural 

invasion in the right mid gland.  I compared and contrasted these findings with 

the patient and his sister.  I also reviewed his change in prostate-specific 

antigen over time.  I entered the prostate-specific antigen values into the 

[facility] nomogram which gave a doubling time of 5-1/2 years.  

Estimated doubling time of Felicitas grade 3+3 is 4-5 years.  This would be more 

consistent with the vast majority of his cancer being 3+3 is what the biopsies 

show.  The small component of Whitetop 4 however does take the patient from a 

low risk category 2 a favorable intermediate risk.





I once again reviewed anticipated survival following NCCN Guidelines.  At age 

82 average survival was 7-1/2 years.  Given his otherwise good health his 

survival estimates good range from 7-11 years.  I told the patient that it was 

highly likely that the Felicitas pattern 4 was present at the time of the 

previous biopsy 1 year ago but was simply not identified due to the tenderness 

of the initial biopsies.  I think it is important that the high risk area as 

identified by the MRI showed no evidence of high-grade prostate cancer.  Based 

on this information I told the patient that he could if he desired continue 

with active surveillance or we could potentially discuss definitive treatment 

options.  However I told him that my recommendation at this time would be to 

strongly consider continued active surveillance.  This is based on the recent 

biopsies and change in prostate-specific antigen that is consistent with a 

majority low-grade prostate cancer.  We again discussed the possibility of 

genetic testing.  I again discussed this option with him reminding him that 

this testing would show was that his cancer would act as anticipated, less 

aggressively than anticipated were more aggressively as anticipated.  He felt 

this information would be extremely helpful for him.  He would like to continue 

with active surveillance and avoid treatment if at all possible.  He feels that 

the additional data from the genomic testing will be valuable for him to make 

final decision about treatment options.  Therefore at his request we will order 

an Oncotype DX testing on the most recent biopsies.





The patient is also scheduled for a second opinion with Dr. Naomi Grimes, 

[facility] for 2018 at 10 AM.  He has all the 

information available to share with her however it is unlikely that the 

Oncotype DX test information will be available at that time.  I indicated that 

I would contact him with results of this test done either review them with him 

in person or on the phone at his preference.





Thank you for allowing us to participate in the care of this patient. 





This chart was completed in part utilizing Dragon Speech Voice Recognition 

software. Attempts were made to minimize the grammatical errors, random word 

insertions, pronoun errors and incomplete sentences. Any formal questions or 

concerns about the content, text or information contained within the body of 

this dictation should be directly addressed to the provider for clarification.





Beny Ingram MD


Department of Radiation Oncology


[facility] and [facility] 











I spent 30 minutes in discussion of the significance of the new biopsy data and 

prostate-specific antigen information and 10 minutes reviewing his chart and in 

preparation of this document.  TRENT





Total Time


In Follow-Up


I spent 20 minutes speaking to the patient performing examination.  I spent 15 

minutes reviewing information completing this note.  AK





Total Time (Attending)


In Follow-Up


I spent 30 minutes in discussion of the significance of the new biopsy data and 

prostate-specific antigen information and 10 minutes reviewing his chart and in 

preparation of this document.  TRENT





Copy To


Bahman Morin M.D.; Enrique Leo MD, Urology

## 2018-07-27 ENCOUNTER — HOSPITAL ENCOUNTER (OUTPATIENT)
Dept: HOSPITAL 45 - C.LABBC | Age: 83
Discharge: HOME | End: 2018-07-27
Attending: INTERNAL MEDICINE
Payer: COMMERCIAL

## 2018-07-27 DIAGNOSIS — I10: Primary | ICD-10-CM

## 2018-07-27 DIAGNOSIS — R73.01: ICD-10-CM

## 2018-07-27 DIAGNOSIS — R53.83: ICD-10-CM

## 2018-07-27 DIAGNOSIS — M54.5: ICD-10-CM

## 2018-07-27 DIAGNOSIS — C61: ICD-10-CM

## 2018-07-27 DIAGNOSIS — E78.5: ICD-10-CM

## 2018-07-27 LAB
ALBUMIN SERPL-MCNC: 3.8 GM/DL (ref 3.4–5)
ALP SERPL-CCNC: 56 U/L (ref 45–117)
ALT SERPL-CCNC: 29 U/L (ref 12–78)
AST SERPL-CCNC: 21 U/L (ref 15–37)
BASOPHILS # BLD: 0.05 K/UL (ref 0–0.2)
BASOPHILS NFR BLD: 1.1 %
BUN SERPL-MCNC: 20 MG/DL (ref 7–18)
CALCIUM SERPL-MCNC: 8.8 MG/DL (ref 8.5–10.1)
CO2 SERPL-SCNC: 31 MMOL/L (ref 21–32)
CREAT SERPL-MCNC: 1.15 MG/DL (ref 0.6–1.4)
EOS ABS #: 0.24 K/UL (ref 0–0.5)
EOSINOPHIL NFR BLD AUTO: 204 K/UL (ref 130–400)
GLUCOSE SERPL-MCNC: 97 MG/DL (ref 70–99)
HBA1C MFR BLD: 5.7 % (ref 4.5–5.6)
HCT VFR BLD CALC: 45.4 % (ref 42–52)
HGB BLD-MCNC: 15.9 G/DL (ref 14–18)
IG#: 0.02 K/UL (ref 0–0.02)
IMM GRANULOCYTES NFR BLD AUTO: 27.8 %
KETONES UR QL STRIP: 70 MG/DL
LYMPHOCYTES # BLD: 1.29 K/UL (ref 1.2–3.4)
MCH RBC QN AUTO: 32.4 PG (ref 25–34)
MCHC RBC AUTO-ENTMCNC: 35 G/DL (ref 32–36)
MCV RBC AUTO: 92.5 FL (ref 80–100)
MONO ABS #: 0.55 K/UL (ref 0.11–0.59)
MONOCYTES NFR BLD: 11.9 %
NEUT ABS #: 2.49 K/UL (ref 1.4–6.5)
NEUTROPHILS # BLD AUTO: 5.2 %
NEUTROPHILS NFR BLD AUTO: 53.6 %
PH UR: 134 MG/DL (ref 0–200)
PMV BLD AUTO: 10.5 FL (ref 7.4–10.4)
POTASSIUM SERPL-SCNC: 4.1 MMOL/L (ref 3.5–5.1)
PROT SERPL-MCNC: 7.1 GM/DL (ref 6.4–8.2)
RED CELL DISTRIBUTION WIDTH CV: 12.3 % (ref 11.5–14.5)
RED CELL DISTRIBUTION WIDTH SD: 41.3 FL (ref 36.4–46.3)
SODIUM SERPL-SCNC: 135 MMOL/L (ref 136–145)
WBC # BLD AUTO: 4.64 K/UL (ref 4.8–10.8)